# Patient Record
Sex: FEMALE | Race: WHITE
[De-identification: names, ages, dates, MRNs, and addresses within clinical notes are randomized per-mention and may not be internally consistent; named-entity substitution may affect disease eponyms.]

---

## 2017-05-03 ENCOUNTER — PRIOR ORIGINAL RECORDS (OUTPATIENT)
Dept: OTHER | Age: 52
End: 2017-05-03

## 2017-11-27 LAB
ALBUMIN: 4.2 G/DL
ALKALINE PHOSPHATATE(ALK PHOS): 106 IU/L
ALT (SGPT): 25 U/L
AST (SGOT): 25 U/L
BILIRUBIN TOTAL: 0.5 MG/DL
BUN: 13 MG/DL
CALCIUM: 9.4 MG/DL
CHLORIDE: 102 MEQ/L
CHOLESTEROL, TOTAL: 270 MG/DL
CREATININE, SERUM: 0.75 MG/DL
GLOBULIN: 3 G/DL
GLUCOSE: 99 MG/DL
GLUCOSE: 99 MG/DL
HDL CHOLESTEROL: 63 MG/DL
HEMATOCRIT: 43.9 %
HEMOGLOBIN: 14.4 G/DL
LDL CHOLESTEROL: 173 MG/DL
PLATELETS: 229 K/UL
POTASSIUM, SERUM: 4.1 MEQ/L
PROTEIN, TOTAL: 7.2 G/DL
RED BLOOD COUNT: 4.91 X 10-6/U
SGOT (AST): 25 IU/L
SGPT (ALT): 25 IU/L
SODIUM: 139 MEQ/L
THYROID STIMULATING HORMONE: 3.02 MLU/L
TRIGLYCERIDES: 171 MG/DL
VITAMIN D 25-OH: 27 NG/ML
WHITE BLOOD COUNT: 6 X 10-3/U

## 2017-11-28 ENCOUNTER — PRIOR ORIGINAL RECORDS (OUTPATIENT)
Dept: OTHER | Age: 52
End: 2017-11-28

## 2017-12-04 LAB
COLLECT DURATION TIME UR: 24 HR
SODIUM 24H UR-SRATE: 113 MMOL/24 HR (ref 40–220)
SODIUM UR-SCNC: 62 MMOL/L
SPECIMEN VOL UR: 1825 ML

## 2017-12-05 ENCOUNTER — PRIOR ORIGINAL RECORDS (OUTPATIENT)
Dept: OTHER | Age: 52
End: 2017-12-05

## 2017-12-05 ENCOUNTER — HOSPITAL (OUTPATIENT)
Dept: OTHER | Age: 52
End: 2017-12-05
Attending: INTERNAL MEDICINE

## 2017-12-05 ENCOUNTER — MYAURORA ACCOUNT LINK (OUTPATIENT)
Dept: OTHER | Age: 52
End: 2017-12-05

## 2017-12-20 ENCOUNTER — MYAURORA ACCOUNT LINK (OUTPATIENT)
Dept: OTHER | Age: 52
End: 2017-12-20

## 2017-12-22 ENCOUNTER — PRIOR ORIGINAL RECORDS (OUTPATIENT)
Dept: OTHER | Age: 52
End: 2017-12-22

## 2017-12-26 ENCOUNTER — PRIOR ORIGINAL RECORDS (OUTPATIENT)
Dept: OTHER | Age: 52
End: 2017-12-26

## 2017-12-27 ENCOUNTER — PRIOR ORIGINAL RECORDS (OUTPATIENT)
Dept: OTHER | Age: 52
End: 2017-12-27

## 2018-01-17 ENCOUNTER — MYAURORA ACCOUNT LINK (OUTPATIENT)
Dept: OTHER | Age: 53
End: 2018-01-17

## 2018-01-17 ENCOUNTER — PRIOR ORIGINAL RECORDS (OUTPATIENT)
Dept: OTHER | Age: 53
End: 2018-01-17

## 2018-01-18 ENCOUNTER — PRIOR ORIGINAL RECORDS (OUTPATIENT)
Dept: OTHER | Age: 53
End: 2018-01-18

## 2018-02-01 LAB
BUN: 11 MG/DL
CALCIUM: 8.5 MG/DL
CHLORIDE: 102 MEQ/L
CREATININE, SERUM: 0.81 MG/DL
GLUCOSE: 171 MG/DL
POTASSIUM, SERUM: 3.9 MEQ/L
SODIUM: 137 MEQ/L

## 2018-02-05 ENCOUNTER — PRIOR ORIGINAL RECORDS (OUTPATIENT)
Dept: OTHER | Age: 53
End: 2018-02-05

## 2018-02-07 ENCOUNTER — PRIOR ORIGINAL RECORDS (OUTPATIENT)
Dept: OTHER | Age: 53
End: 2018-02-07

## 2018-02-14 ENCOUNTER — PRIOR ORIGINAL RECORDS (OUTPATIENT)
Dept: OTHER | Age: 53
End: 2018-02-14

## 2018-04-02 ENCOUNTER — PRIOR ORIGINAL RECORDS (OUTPATIENT)
Dept: OTHER | Age: 53
End: 2018-04-02

## 2018-06-27 ENCOUNTER — PRIOR ORIGINAL RECORDS (OUTPATIENT)
Dept: OTHER | Age: 53
End: 2018-06-27

## 2018-07-18 ENCOUNTER — PRIOR ORIGINAL RECORDS (OUTPATIENT)
Dept: OTHER | Age: 53
End: 2018-07-18

## 2018-07-18 ENCOUNTER — MYAURORA ACCOUNT LINK (OUTPATIENT)
Dept: OTHER | Age: 53
End: 2018-07-18

## 2018-07-25 ENCOUNTER — MYAURORA ACCOUNT LINK (OUTPATIENT)
Dept: OTHER | Age: 53
End: 2018-07-25

## 2018-07-25 ENCOUNTER — HOSPITAL (OUTPATIENT)
Dept: OTHER | Age: 53
End: 2018-07-25
Attending: FAMILY MEDICINE

## 2018-07-25 ENCOUNTER — PRIOR ORIGINAL RECORDS (OUTPATIENT)
Dept: OTHER | Age: 53
End: 2018-07-25

## 2018-07-25 LAB
ALBUMIN: 4.4 G/DL
ALKALINE PHOSPHATATE(ALK PHOS): 124 IU/L
BILIRUBIN TOTAL: 0.5 MG/DL
BUN: 16 MG/DL
CALCIUM: 9.6 MG/DL
CHLORIDE: 103 MEQ/L
CHOLESTEROL, TOTAL: 300 MG/DL
CREATININE, SERUM: 0.68 MG/DL
ESR (SED RATE): 6 MM/HR
GLOBULIN: 2.8 G/DL
GLUCOSE: 97 MG/DL
HDL CHOLESTEROL: 74 MG/DL
LDL CHOLESTEROL: 203 MG/DL
POTASSIUM, SERUM: 4.1 MEQ/L
PROTEIN, TOTAL: 7.2 G/DL
SGOT (AST): 28 IU/L
SGPT (ALT): 34 IU/L
SODIUM: 137 MEQ/L
THYROID STIMULATING HORMONE: 1.66 MLU/L
TRIGLYCERIDES: 100 MG/DL

## 2018-07-26 ENCOUNTER — PRIOR ORIGINAL RECORDS (OUTPATIENT)
Dept: OTHER | Age: 53
End: 2018-07-26

## 2018-07-27 ENCOUNTER — PRIOR ORIGINAL RECORDS (OUTPATIENT)
Dept: OTHER | Age: 53
End: 2018-07-27

## 2018-07-30 ENCOUNTER — PRIOR ORIGINAL RECORDS (OUTPATIENT)
Dept: OTHER | Age: 53
End: 2018-07-30

## 2018-08-01 ENCOUNTER — PRIOR ORIGINAL RECORDS (OUTPATIENT)
Dept: OTHER | Age: 53
End: 2018-08-01

## 2018-08-14 ENCOUNTER — HOSPITAL (OUTPATIENT)
Dept: OTHER | Age: 53
End: 2018-08-14
Attending: INTERNAL MEDICINE

## 2018-08-16 ENCOUNTER — PRIOR ORIGINAL RECORDS (OUTPATIENT)
Dept: OTHER | Age: 53
End: 2018-08-16

## 2018-08-24 ENCOUNTER — PRIOR ORIGINAL RECORDS (OUTPATIENT)
Dept: OTHER | Age: 53
End: 2018-08-24

## 2019-01-21 ENCOUNTER — OFFICE VISIT (OUTPATIENT)
Dept: FAMILY MEDICINE CLINIC | Facility: CLINIC | Age: 54
End: 2019-01-21
Payer: COMMERCIAL

## 2019-01-21 ENCOUNTER — APPOINTMENT (OUTPATIENT)
Dept: LAB | Facility: REFERENCE LAB | Age: 54
End: 2019-01-21
Attending: FAMILY MEDICINE
Payer: COMMERCIAL

## 2019-01-21 VITALS
HEIGHT: 62 IN | OXYGEN SATURATION: 98 % | BODY MASS INDEX: 32.02 KG/M2 | SYSTOLIC BLOOD PRESSURE: 124 MMHG | WEIGHT: 174 LBS | DIASTOLIC BLOOD PRESSURE: 82 MMHG | HEART RATE: 100 BPM

## 2019-01-21 DIAGNOSIS — I10 HYPERTENSION WITH GOAL BLOOD PRESSURE LESS THAN 140/90: ICD-10-CM

## 2019-01-21 DIAGNOSIS — M19.041 OSTEOARTHRITIS OF BOTH HANDS, UNSPECIFIED OSTEOARTHRITIS TYPE: ICD-10-CM

## 2019-01-21 DIAGNOSIS — M79.672 BILATERAL FOOT PAIN: ICD-10-CM

## 2019-01-21 DIAGNOSIS — M19.042 OSTEOARTHRITIS OF BOTH HANDS, UNSPECIFIED OSTEOARTHRITIS TYPE: ICD-10-CM

## 2019-01-21 DIAGNOSIS — M17.11 OSTEOARTHRITIS OF RIGHT KNEE, UNSPECIFIED OSTEOARTHRITIS TYPE: ICD-10-CM

## 2019-01-21 DIAGNOSIS — M79.671 BILATERAL FOOT PAIN: ICD-10-CM

## 2019-01-21 DIAGNOSIS — I10 HYPERTENSION WITH GOAL BLOOD PRESSURE LESS THAN 140/90: Primary | ICD-10-CM

## 2019-01-21 LAB
ANION GAP SERPL CALC-SCNC: 11 MMOL/L (ref 0–18)
BUN SERPL-MCNC: 18 MG/DL (ref 8–20)
BUN/CREAT SERPL: 27.7 (ref 10–20)
CALCIUM SERPL-MCNC: 8.7 MG/DL (ref 8.5–10.5)
CHLORIDE SERPL-SCNC: 103 MMOL/L (ref 95–110)
CO2 SERPL-SCNC: 24 MMOL/L (ref 22–32)
CREAT SERPL-MCNC: 0.65 MG/DL (ref 0.5–1.5)
GLUCOSE SERPL-MCNC: 128 MG/DL (ref 70–99)
OSMOLALITY UR CALC.SUM OF ELEC: 290 MOSM/KG (ref 275–295)
POTASSIUM SERPL-SCNC: 3.7 MMOL/L (ref 3.3–5.1)
SODIUM SERPL-SCNC: 138 MMOL/L (ref 136–144)

## 2019-01-21 PROCEDURE — 80048 BASIC METABOLIC PNL TOTAL CA: CPT

## 2019-01-21 PROCEDURE — 36415 COLL VENOUS BLD VENIPUNCTURE: CPT

## 2019-01-21 PROCEDURE — 99203 OFFICE O/P NEW LOW 30 MIN: CPT | Performed by: FAMILY MEDICINE

## 2019-01-21 PROCEDURE — 82306 VITAMIN D 25 HYDROXY: CPT

## 2019-01-21 RX ORDER — TELMISARTAN AND AMLODIPINE 10; 80 MG/1; MG/1
1 TABLET ORAL DAILY
Qty: 90 TABLET | Refills: 1 | Status: SHIPPED | OUTPATIENT
Start: 2019-01-21 | End: 2019-10-25

## 2019-01-21 RX ORDER — EPLERENONE 25 MG/1
TABLET, FILM COATED ORAL
Refills: 0 | COMMUNITY
Start: 2018-12-06 | End: 2019-01-21

## 2019-01-21 RX ORDER — TELMISARTAN AND AMLODIPINE 10; 80 MG/1; MG/1
TABLET ORAL
Refills: 0 | COMMUNITY
Start: 2018-12-18 | End: 2019-01-21

## 2019-01-21 RX ORDER — EPLERENONE 25 MG/1
25 TABLET, FILM COATED ORAL DAILY
Qty: 90 TABLET | Refills: 1 | Status: SHIPPED | OUTPATIENT
Start: 2019-01-21 | End: 2019-07-24

## 2019-01-21 NOTE — PROGRESS NOTES
CC:  Patient presents with:  Establish Care  Blood Pressure  Medication Request: telmisartan/amlodipine and eplerenone       HPI: 48year old female here to establish care and discuss hypertension management and osteoarthritis.   Previous PCP was Dr. Jason Van Financial resource strain: Not on file      Food insecurity - worry: Not on file      Food insecurity - inability: Not on file      Transportation needs - medical: Not on file      Transportation needs - non-medical: Not on file    Occupational History pressure less than 140/90    - BP well-controlled on Telmisartan-Amlodipine and Eplerenone   - Will check BMP to ensure potassium stable and refills of medications provided   - BASIC METABOLIC PANEL (8); Future    2.  Osteoarthritis of both hands, unspecifi

## 2019-01-21 NOTE — PATIENT INSTRUCTIONS
-Start Tumeric 1,000 mg twice daily for arthritis pain  -Continue Vitamin D and will check your level today to make sure you are not deficient   Osteoarthritis: Natural and Alternative Treatments     Therapeutic massage is one alternative treatment option. 3. Take a breath in through your nose. The hand on your stomach should rise. The hand on your chest should move very little. 4. Breathe out through your mouth, pushing out as much air as you can.  The hand on your stomach should move in as you breathe out, · Talk with your healthcare provider regarding your optimal weight and techniques for weight management if necessary.   Psychological treatments  Research shows that many psychological therapies or those that deal with thinking and emotions, help people co · Limit saturated fats and trans fats. Saturated fats raise your levels of cholesterol, so keep these fats to a minimum. They are found in foods such as fatty meats, whole milk, cheese, and palm and coconut oils.  Avoid trans fats because they lower good ch · Healthy fats can be good for you in small amounts. These are unsaturated fats, such as olive oil, nuts, and fish. Try to have at least 2 servings per week of fatty fish, such as salmon, sardines, mackerel, rainbow trout, and albacore tuna.  These contain

## 2019-01-23 LAB — 25(OH)D3 SERPL-MCNC: 31.6 NG/ML (ref 30–100)

## 2019-02-28 VITALS
BODY MASS INDEX: 33.13 KG/M2 | RESPIRATION RATE: 16 BRPM | SYSTOLIC BLOOD PRESSURE: 164 MMHG | WEIGHT: 180 LBS | HEIGHT: 62 IN | HEART RATE: 92 BPM | DIASTOLIC BLOOD PRESSURE: 110 MMHG

## 2019-02-28 VITALS
BODY MASS INDEX: 33.13 KG/M2 | SYSTOLIC BLOOD PRESSURE: 180 MMHG | HEART RATE: 80 BPM | RESPIRATION RATE: 18 BRPM | WEIGHT: 180 LBS | HEIGHT: 62 IN | DIASTOLIC BLOOD PRESSURE: 100 MMHG

## 2019-02-28 VITALS
HEART RATE: 80 BPM | SYSTOLIC BLOOD PRESSURE: 133 MMHG | BODY MASS INDEX: 32.76 KG/M2 | RESPIRATION RATE: 16 BRPM | DIASTOLIC BLOOD PRESSURE: 90 MMHG | WEIGHT: 178 LBS | HEIGHT: 62 IN

## 2019-02-28 VITALS
DIASTOLIC BLOOD PRESSURE: 94 MMHG | RESPIRATION RATE: 16 BRPM | HEART RATE: 92 BPM | WEIGHT: 168 LBS | HEIGHT: 62 IN | BODY MASS INDEX: 30.91 KG/M2 | SYSTOLIC BLOOD PRESSURE: 130 MMHG

## 2019-02-28 VITALS
SYSTOLIC BLOOD PRESSURE: 134 MMHG | HEIGHT: 62 IN | WEIGHT: 181 LBS | BODY MASS INDEX: 33.31 KG/M2 | DIASTOLIC BLOOD PRESSURE: 90 MMHG | RESPIRATION RATE: 16 BRPM | HEART RATE: 94 BPM

## 2019-03-12 RX ORDER — TELMISARTAN AND AMLODIPINE 10; 80 MG/1; MG/1
1 TABLET ORAL DAILY
COMMUNITY
Start: 2017-11-28

## 2019-03-12 RX ORDER — CHLORTHALIDONE 25 MG/1
1 TABLET ORAL DAILY
COMMUNITY
Start: 2017-11-28

## 2019-03-12 RX ORDER — METOPROLOL SUCCINATE 100 MG/1
1 TABLET, EXTENDED RELEASE ORAL DAILY
COMMUNITY
Start: 2017-11-28

## 2019-03-12 RX ORDER — ATORVASTATIN CALCIUM 40 MG/1
1 TABLET, FILM COATED ORAL DAILY
COMMUNITY
Start: 2018-07-18

## 2019-03-12 RX ORDER — EPLERENONE 25 MG/1
1 TABLET, FILM COATED ORAL DAILY
COMMUNITY
Start: 2018-12-06

## 2019-05-07 ENCOUNTER — LAB ENCOUNTER (OUTPATIENT)
Dept: LAB | Facility: REFERENCE LAB | Age: 54
End: 2019-05-07
Attending: FAMILY MEDICINE
Payer: COMMERCIAL

## 2019-05-07 ENCOUNTER — OFFICE VISIT (OUTPATIENT)
Dept: FAMILY MEDICINE CLINIC | Facility: CLINIC | Age: 54
End: 2019-05-07
Payer: COMMERCIAL

## 2019-05-07 VITALS
OXYGEN SATURATION: 98 % | HEART RATE: 80 BPM | WEIGHT: 178 LBS | SYSTOLIC BLOOD PRESSURE: 124 MMHG | BODY MASS INDEX: 32.76 KG/M2 | DIASTOLIC BLOOD PRESSURE: 76 MMHG | HEIGHT: 62 IN

## 2019-05-07 DIAGNOSIS — Z00.01 ENCOUNTER FOR ROUTINE ADULT HEALTH EXAMINATION WITH ABNORMAL FINDINGS: Primary | ICD-10-CM

## 2019-05-07 DIAGNOSIS — Z11.59 ENCOUNTER FOR HEPATITIS C SCREENING TEST FOR LOW RISK PATIENT: ICD-10-CM

## 2019-05-07 DIAGNOSIS — M17.11 OSTEOARTHRITIS OF RIGHT KNEE, UNSPECIFIED OSTEOARTHRITIS TYPE: ICD-10-CM

## 2019-05-07 DIAGNOSIS — R74.8 ELEVATED ALKALINE PHOSPHATASE LEVEL: Primary | ICD-10-CM

## 2019-05-07 DIAGNOSIS — Z00.01 ENCOUNTER FOR ROUTINE ADULT HEALTH EXAMINATION WITH ABNORMAL FINDINGS: ICD-10-CM

## 2019-05-07 DIAGNOSIS — M19.041 OSTEOARTHRITIS OF BOTH HANDS, UNSPECIFIED OSTEOARTHRITIS TYPE: ICD-10-CM

## 2019-05-07 DIAGNOSIS — Z12.31 SCREENING MAMMOGRAM, ENCOUNTER FOR: ICD-10-CM

## 2019-05-07 DIAGNOSIS — M19.042 OSTEOARTHRITIS OF BOTH HANDS, UNSPECIFIED OSTEOARTHRITIS TYPE: ICD-10-CM

## 2019-05-07 PROCEDURE — 86803 HEPATITIS C AB TEST: CPT | Performed by: FAMILY MEDICINE

## 2019-05-07 PROCEDURE — 80053 COMPREHEN METABOLIC PANEL: CPT | Performed by: FAMILY MEDICINE

## 2019-05-07 PROCEDURE — 99396 PREV VISIT EST AGE 40-64: CPT | Performed by: FAMILY MEDICINE

## 2019-05-07 PROCEDURE — 85025 COMPLETE CBC W/AUTO DIFF WBC: CPT | Performed by: FAMILY MEDICINE

## 2019-05-07 PROCEDURE — 82977 ASSAY OF GGT: CPT | Performed by: FAMILY MEDICINE

## 2019-05-07 PROCEDURE — 36415 COLL VENOUS BLD VENIPUNCTURE: CPT | Performed by: FAMILY MEDICINE

## 2019-05-07 RX ORDER — MELOXICAM 7.5 MG/1
7.5 TABLET ORAL DAILY
Qty: 30 TABLET | Refills: 2 | Status: SHIPPED | OUTPATIENT
Start: 2019-05-07

## 2019-05-07 NOTE — PATIENT INSTRUCTIONS
-Try Samara 500 mg three times a day as needed for hot flashes       Prevention Guidelines, Women Ages 48 to 59  Screening tests and vaccines are an important part of managing your health.  A screening test is done to find possible disorders or diseases in pe (HPV) test every 5 years   Chlamydia Women at increased risk for infection At routine exams   Colorectal cancer All women in this age group Flexible sigmoidoscopy every 5 years, or colonoscopy every 10 years, or double-contrast barium enema every 5 years; should be given at least 2 months after the second dose and at least 4 months after the first dose   Haemophilus influenzaeType B (HIB) Women at increased risk for infection – talk with your healthcare provider 1 to 3 doses   Influenza (flu) All women in t [address]. [address] [facility] [place], [address] [place] [address]. All rights reserved. This information is not intended as a substitute for professional medical care. Always follow your healthcare professional's instructions.

## 2019-05-07 NOTE — PROGRESS NOTES
HPI:   Dionte Calhoun is a 48year old female who presents for a complete physical exam.     Last pap: 5/2018 and normal   Last mammogram: 5/2018 and normal    Menses: Irregular over the last several months   Previous colonoscopy: Cologuard 2018 and pauline Procedure Laterality Date   • CORRECT BUNION,SIMPLE Left    • CYSTOSCOPY N/A 3/12/2015    Performed by Magda Martinez MD at [facility]   •   x3   • SLING UROLOGY N/A 3/12/2015    Performed by Magda Martinez MD at [facility] HCV Antibody [E]      Comp Metabolic Panel (14) [E]      CBC W Differential W Platelet [E]      [facility] AMB COLOGUARD (RESULTS ONLY)      [facility] AMB COLOGUARD (RESULTS ONLY)    Will trial Meloxicam 7.5 mg daily as needed for severe OA (knee and hand pain) but to tablet 2     Sig: Take 1 tablet (7.5 mg total) by mouth daily.        Imaging & Consults:  ORTHOPEDIC - INTERNAL  CATIE NIYA 2D+3D SCREENING BILAT (CPT=77067/32102)    Asia Trinh,   5/7/2019  3:49 PM

## 2019-05-08 ENCOUNTER — HOSPITAL ENCOUNTER (OUTPATIENT)
Dept: MAMMOGRAPHY | Age: 54
Discharge: HOME | End: 2019-05-08
Attending: FAMILY MEDICINE
Payer: COMMERCIAL

## 2019-05-08 DIAGNOSIS — R74.8 ELEVATED ALKALINE PHOSPHATASE LEVEL: Primary | ICD-10-CM

## 2019-05-08 DIAGNOSIS — Z12.31 SCREENING MAMMOGRAM, ENCOUNTER FOR: ICD-10-CM

## 2019-05-08 PROCEDURE — 77063 BREAST TOMOSYNTHESIS BI: CPT | Performed by: FAMILY MEDICINE

## 2019-05-08 PROCEDURE — 77067 SCR MAMMO BI INCL CAD: CPT | Performed by: FAMILY MEDICINE

## 2019-07-25 RX ORDER — EPLERENONE 25 MG/1
25 TABLET, FILM COATED ORAL DAILY
Qty: 90 TABLET | Refills: 0 | Status: SHIPPED | OUTPATIENT
Start: 2019-07-25 | End: 2019-10-25

## 2019-10-25 RX ORDER — EPLERENONE 25 MG/1
TABLET, FILM COATED ORAL
Qty: 90 TABLET | Refills: 0 | Status: SHIPPED | OUTPATIENT
Start: 2019-10-25 | End: 2020-01-23

## 2019-10-25 RX ORDER — TELMISARTAN AND AMLODIPINE 10; 80 MG/1; MG/1
1 TABLET ORAL DAILY
Qty: 90 TABLET | Refills: 0 | Status: SHIPPED | OUTPATIENT
Start: 2019-10-25 | End: 2020-02-13

## 2019-12-09 ENCOUNTER — TELEPHONE (OUTPATIENT)
Dept: FAMILY MEDICINE CLINIC | Facility: CLINIC | Age: 54
End: 2019-12-09

## 2020-01-23 RX ORDER — EPLERENONE 25 MG/1
TABLET, FILM COATED ORAL
Qty: 90 TABLET | Refills: 0 | Status: SHIPPED | OUTPATIENT
Start: 2020-01-23 | End: 2020-05-19

## 2020-01-23 NOTE — TELEPHONE ENCOUNTER
A refill request was received for:  Requested Prescriptions     Pending Prescriptions Disp Refills   • EPLERENONE 25 MG Oral Tab [Pharmacy Med Name: Eplerenone 25 Mg Tab Rigoberto] 90 tablet 0     Sig: TAKE ONE TABLET BY MOUTH ONE TIME DAILY     Last refill jose

## 2020-02-13 RX ORDER — TELMISARTAN AND AMLODIPINE 10; 80 MG/1; MG/1
1 TABLET ORAL DAILY
Qty: 30 TABLET | Refills: 0 | Status: SHIPPED | OUTPATIENT
Start: 2020-02-13 | End: 2020-05-19

## 2020-05-19 ENCOUNTER — OFFICE VISIT (OUTPATIENT)
Dept: FAMILY MEDICINE CLINIC | Facility: CLINIC | Age: 55
End: 2020-05-19
Payer: COMMERCIAL

## 2020-05-19 VITALS
WEIGHT: 174 LBS | BODY MASS INDEX: 32.02 KG/M2 | HEIGHT: 62 IN | SYSTOLIC BLOOD PRESSURE: 132 MMHG | OXYGEN SATURATION: 99 % | HEART RATE: 94 BPM | DIASTOLIC BLOOD PRESSURE: 88 MMHG | TEMPERATURE: 98 F

## 2020-05-19 DIAGNOSIS — Z00.01 ENCOUNTER FOR ROUTINE ADULT HEALTH EXAMINATION WITH ABNORMAL FINDINGS: Primary | ICD-10-CM

## 2020-05-19 DIAGNOSIS — I10 HYPERTENSION WITH GOAL BLOOD PRESSURE LESS THAN 140/90: ICD-10-CM

## 2020-05-19 DIAGNOSIS — Z12.31 SCREENING MAMMOGRAM, ENCOUNTER FOR: ICD-10-CM

## 2020-05-19 PROCEDURE — 99396 PREV VISIT EST AGE 40-64: CPT | Performed by: FAMILY MEDICINE

## 2020-05-19 PROCEDURE — 3079F DIAST BP 80-89 MM HG: CPT | Performed by: FAMILY MEDICINE

## 2020-05-19 PROCEDURE — 3075F SYST BP GE 130 - 139MM HG: CPT | Performed by: FAMILY MEDICINE

## 2020-05-19 PROCEDURE — 99213 OFFICE O/P EST LOW 20 MIN: CPT | Performed by: FAMILY MEDICINE

## 2020-05-19 PROCEDURE — 3008F BODY MASS INDEX DOCD: CPT | Performed by: FAMILY MEDICINE

## 2020-05-19 RX ORDER — EPLERENONE 25 MG/1
25 TABLET, FILM COATED ORAL DAILY
Qty: 90 TABLET | Refills: 3 | Status: SHIPPED | OUTPATIENT
Start: 2020-05-19 | End: 2021-05-24

## 2020-05-19 RX ORDER — TELMISARTAN AND AMLODIPINE 10; 80 MG/1; MG/1
1 TABLET ORAL DAILY
Qty: 90 TABLET | Refills: 3 | Status: SHIPPED | OUTPATIENT
Start: 2020-05-19 | End: 2021-05-27

## 2020-05-19 NOTE — PROGRESS NOTES
HPI:   Kenia Us is a 54year old female who presents for a complete physical exam.   Patient presents with:  Blood Pressure: check up on blood pressure  Medication Request    Was supposed to have her right knee replacement in June but might be jose mouth daily.  30 tablet 2       Pcn [Penicillins]       HIVES   Past Medical History:   Diagnosis Date   • Hypercholesterolemia    • Kidney stone    • Migraines    • Osteoarthritis    • Reflux    • Shingles 12/2019   • Stress incontinence, female 1/13/2015 EXTREMITIES: no edema    No results found for: CHOLEST, HDL, LDL, TRIGLY   ASSESSMENT AND PLAN:   Paul Brunson is a 54year old female who presents for a complete physical exam.  Encounter for routine adult health examination with abnormal findings  ( needed. Meds & Refills for this Visit:  Requested Prescriptions     Signed Prescriptions Disp Refills   • Telmisartan-amLODIPine 80-10 MG Oral Tab 90 tablet 3     Sig: Take 1 tablet by mouth daily.    • eplerenone 25 MG Oral Tab 90 tablet 3     Sig: Take

## 2020-05-19 NOTE — PATIENT INSTRUCTIONS
Prevention Guidelines, Women Ages 48 to 59  Screening tests and vaccines are an important part of managing your health. A screening test is done to find possible disorders or diseases in people who don't have any symptoms.  The goal is to find a disease e Colorectal cancer All women at average risk in this age group Multiple tests are available and are used at different times.  Possible tests include:  · Flexible sigmoidoscopy every 5 years, or  · Colonoscopy every 10 years, or  · CT colonography (virtual co Chickenpox (varicella) All women in this age group who have no record of this infection or vaccine 2 doses; the second dose should be given at least 4 weeks after the first dose   Hepatitis A Women at increased risk for infection – talk with your healthcar Diet and exercise Women who are overweight or obese When diagnosed, and then at routine exams   Sexually transmitted infection prevention Women at increased risk for infection – talk with your healthcare provider At routine exams   Use of daily aspirin Wom · Tomato juice and ketchup  · Canned vegetables, soups, and fish not labeled as no-salt-added or reduced sodium  · Packaged gravies and sauces  · Olives, pickles, and relish  · Bottled salad dressings    For snacks and desserts  · Yogurt  · Unsalted, air-p

## 2020-05-26 ENCOUNTER — LAB ENCOUNTER (OUTPATIENT)
Dept: LAB | Facility: REFERENCE LAB | Age: 55
End: 2020-05-26
Attending: FAMILY MEDICINE
Payer: COMMERCIAL

## 2020-05-26 ENCOUNTER — LAB ENCOUNTER (OUTPATIENT)
Dept: LAB | Age: 55
End: 2020-05-26
Attending: FAMILY MEDICINE
Payer: COMMERCIAL

## 2020-05-26 ENCOUNTER — HOSPITAL ENCOUNTER (OUTPATIENT)
Dept: MAMMOGRAPHY | Age: 55
Discharge: HOME | End: 2020-05-26
Attending: FAMILY MEDICINE
Payer: COMMERCIAL

## 2020-05-26 DIAGNOSIS — Z00.00 ENCOUNTER FOR PREVENTATIVE ADULT HEALTH CARE EXAMINATION: Primary | ICD-10-CM

## 2020-05-26 DIAGNOSIS — Z00.01 ENCOUNTER FOR ROUTINE ADULT HEALTH EXAMINATION WITH ABNORMAL FINDINGS: ICD-10-CM

## 2020-05-26 DIAGNOSIS — Z00.00 ENCOUNTER FOR PREVENTATIVE ADULT HEALTH CARE EXAMINATION: ICD-10-CM

## 2020-05-26 DIAGNOSIS — Z12.31 SCREENING MAMMOGRAM, ENCOUNTER FOR: ICD-10-CM

## 2020-05-26 DIAGNOSIS — Z00.00 ANNUAL PHYSICAL EXAM: Primary | ICD-10-CM

## 2020-05-26 DIAGNOSIS — Z00.00 ADULT GENERAL MEDICAL EXAMINATION: ICD-10-CM

## 2020-05-26 DIAGNOSIS — Z00.00 ADULT GENERAL MEDICAL EXAMINATION: Primary | ICD-10-CM

## 2020-05-26 PROBLEM — E78.2 MIXED HYPERLIPIDEMIA: Status: ACTIVE | Noted: 2020-05-26

## 2020-05-26 PROCEDURE — 85025 COMPLETE CBC W/AUTO DIFF WBC: CPT

## 2020-05-26 PROCEDURE — 77067 SCR MAMMO BI INCL CAD: CPT | Performed by: FAMILY MEDICINE

## 2020-05-26 PROCEDURE — 77063 BREAST TOMOSYNTHESIS BI: CPT | Performed by: FAMILY MEDICINE

## 2020-05-26 PROCEDURE — 80061 LIPID PANEL: CPT

## 2020-05-26 PROCEDURE — 80053 COMPREHEN METABOLIC PANEL: CPT

## 2020-05-26 PROCEDURE — 83036 HEMOGLOBIN GLYCOSYLATED A1C: CPT

## 2020-05-26 PROCEDURE — 36415 COLL VENOUS BLD VENIPUNCTURE: CPT

## 2020-05-26 PROCEDURE — 81001 URINALYSIS AUTO W/SCOPE: CPT

## 2020-05-26 PROCEDURE — 93010 ELECTROCARDIOGRAM REPORT: CPT | Performed by: FAMILY MEDICINE

## 2020-05-26 PROCEDURE — 93005 ELECTROCARDIOGRAM TRACING: CPT

## 2020-07-07 ENCOUNTER — TELEPHONE (OUTPATIENT)
Dept: FAMILY MEDICINE CLINIC | Facility: CLINIC | Age: 55
End: 2020-07-07

## 2020-07-07 NOTE — TELEPHONE ENCOUNTER
Christianne(Nurse) from [facility] requesting copy of patient EKG results Faxed over to [phone]

## 2021-04-09 DIAGNOSIS — Z23 NEED FOR VACCINATION: ICD-10-CM

## 2021-05-24 RX ORDER — EPLERENONE 25 MG/1
25 TABLET, FILM COATED ORAL DAILY
Qty: 60 TABLET | Refills: 0 | Status: SHIPPED | OUTPATIENT
Start: 2021-05-24

## 2021-05-27 RX ORDER — TELMISARTAN AND AMLODIPINE 10; 80 MG/1; MG/1
1 TABLET ORAL DAILY
Qty: 90 TABLET | Refills: 0 | Status: SHIPPED | OUTPATIENT
Start: 2021-05-27 | End: 2021-08-28

## 2021-07-12 LAB — AMB EXT COLOGUARD RESULT: NEGATIVE

## 2021-08-28 RX ORDER — TELMISARTAN AND AMLODIPINE 10; 80 MG/1; MG/1
1 TABLET ORAL DAILY
Qty: 30 TABLET | Refills: 0 | Status: SHIPPED | OUTPATIENT
Start: 2021-08-28 | End: 2021-09-27

## 2021-09-27 RX ORDER — TELMISARTAN AND AMLODIPINE 10; 80 MG/1; MG/1
1 TABLET ORAL DAILY
Qty: 15 TABLET | Refills: 0 | Status: SHIPPED | OUTPATIENT
Start: 2021-09-27

## 2022-06-20 ENCOUNTER — TELEPHONE (OUTPATIENT)
Dept: FAMILY MEDICINE CLINIC | Facility: CLINIC | Age: 57
End: 2022-06-20